# Patient Record
Sex: MALE | Race: WHITE | NOT HISPANIC OR LATINO | Employment: FULL TIME | URBAN - METROPOLITAN AREA
[De-identification: names, ages, dates, MRNs, and addresses within clinical notes are randomized per-mention and may not be internally consistent; named-entity substitution may affect disease eponyms.]

---

## 2020-08-08 ENCOUNTER — COMMUNICATION - HEALTHEAST (OUTPATIENT)
Dept: SCHEDULING | Facility: CLINIC | Age: 35
End: 2020-08-08

## 2020-08-12 ENCOUNTER — RECORDS - HEALTHEAST (OUTPATIENT)
Dept: ADMINISTRATIVE | Facility: OTHER | Age: 35
End: 2020-08-12

## 2021-06-10 NOTE — TELEPHONE ENCOUNTER
"No PCP.   Caller states he was tested on Tuesday and found out yesterday that his COVID test was positive. Today he feels like he is starting to get a fever= 99.5 (usually=97). Intermittent cough. Since last night he has right sided (\"pec\") chest pain and tightness. Chest pain varies from=\"5\" to \"unbearable if I move a certain way\". He has lost his sense of taste and smell.   Triaged to a disposition of Go to ED now, which he agrees to do.    Reason for Disposition    SEVERE or constant chest pain or pressure (Exception: mild central chest pain, present only when coughing)    Additional Information    Negative: SEVERE difficulty breathing (e.g., struggling for each breath, speaks in single words)    Negative: Difficult to awaken or acting confused (e.g., disoriented, slurred speech)    Negative: Bluish (or gray) lips or face now    Negative: Shock suspected (e.g., cold/pale/clammy skin, too weak to stand, low BP, rapid pulse)    Negative: Sounds like a life-threatening emergency to the triager    Negative: [1] COVID-19 exposure AND [2] no symptoms    Negative: COVID-19 and Breastfeeding, questions about    Negative: [1] Adult with possible COVID-19 symptoms AND [2] triager concerned about severity of symptoms or other causes    Protocols used: CORONAVIRUS (COVID-19) DIAGNOSED OR [id number] 5.16.20    COVID 19 Nurse Triage Plan/Patient Instructions    Please be aware that novel coronavirus (COVID-19) may be circulating in the community. If you develop symptoms such as fever, cough, or SOB or if you have concerns about the presence of another infection including coronavirus (COVID-19), please contact your health care provider or visit [email or web address].     Disposition/Instructions    ED Visit recommended. Follow protocol based instructions.      Bring Your Own Device:  Please also bring your smart device(s) (smart phones, tablets, laptops) and their charging cables for your personal use and to communicate with your " care team during your visit.      Thank you for taking steps to prevent the spread of this virus.  o Limit your contact with others.  o Wear a simple mask to cover your cough.  o Wash your hands well and often.    Resources    [facility]: About COVID-19: [email or web address]    CDC: What to Do If You're Sick: www.cdc.gov/coronavirus/2019-ncov/about/steps-when-sick.html    CDC: Ending Home Isolation: www.cdc.gov/coronavirus/2019-ncov/hcp/disposition-in-home-patients.html     CDC: Caring for Someone: www.cdc.gov/coronavirus/2019-ncov/if-you-are-sick/care-for-someone.html     [facility]: Interim Guidance for Hospital Discharge to Home: www.[facility].mn./diseases/coronavirus/hcp/hospdischarge.pdf    [facility] clinical trials (COVID-19 research studies): [email or web address].[facility]-clinical-trials     Below are the COVID-19 hotlines at the [place] [facility] ([facility]). Interpreters are available.   o For health questions: Call 624-551-1362 or 1-375.720.3646 (7 a.m. to 7 p.m.)  o For questions about schools and childcare: Call 944-570-5945 or 1-536.998.2974 (7 a.m. to 7 p.m.)

## 2021-09-25 ENCOUNTER — HOSPITAL ENCOUNTER (EMERGENCY)
Facility: CLINIC | Age: 36
Discharge: HOME | End: 2021-09-25
Admitting: EMERGENCY MEDICINE
Payer: COMMERCIAL

## 2021-09-25 VITALS
HEART RATE: 89 BPM | HEIGHT: 75 IN | OXYGEN SATURATION: 99 % | SYSTOLIC BLOOD PRESSURE: 142 MMHG | TEMPERATURE: 98.2 F | BODY MASS INDEX: 24.87 KG/M2 | WEIGHT: 200 LBS | DIASTOLIC BLOOD PRESSURE: 67 MMHG | RESPIRATION RATE: 16 BRPM

## 2021-09-25 DIAGNOSIS — M62.838 MUSCLE SPASM: ICD-10-CM

## 2021-09-25 DIAGNOSIS — M25.512 ACUTE PAIN OF LEFT SHOULDER: ICD-10-CM

## 2021-09-25 PROCEDURE — 96372 THER/PROPH/DIAG INJ SC/IM: CPT | Performed by: PHYSICIAN ASSISTANT

## 2021-09-25 PROCEDURE — 99284 EMERGENCY DEPT VISIT MOD MDM: CPT | Mod: 25

## 2021-09-25 PROCEDURE — 250N000013 HC RX MED GY IP 250 OP 250 PS 637: Performed by: PHYSICIAN ASSISTANT

## 2021-09-25 PROCEDURE — 250N000011 HC RX IP 250 OP 636: Performed by: PHYSICIAN ASSISTANT

## 2021-09-25 RX ORDER — KETOROLAC TROMETHAMINE 15 MG/ML
15 INJECTION, SOLUTION INTRAMUSCULAR; INTRAVENOUS ONCE
Status: COMPLETED | OUTPATIENT
Start: 2021-09-25 | End: 2021-09-25

## 2021-09-25 RX ORDER — LIDOCAINE 4 G/G
1 PATCH TOPICAL ONCE
Status: DISCONTINUED | OUTPATIENT
Start: 2021-09-25 | End: 2021-09-25 | Stop reason: HOSPADM

## 2021-09-25 RX ORDER — CYCLOBENZAPRINE HCL 10 MG
10 TABLET ORAL 3 TIMES DAILY PRN
Qty: 15 TABLET | Refills: 0 | Status: SHIPPED | OUTPATIENT
Start: 2021-09-25 | End: 2021-10-01

## 2021-09-25 RX ADMIN — KETOROLAC TROMETHAMINE 15 MG: 15 INJECTION, SOLUTION INTRAMUSCULAR; INTRAVENOUS at 13:39

## 2021-09-25 RX ADMIN — LIDOCAINE 1 PATCH: 246 PATCH TOPICAL at 13:39

## 2021-09-25 ASSESSMENT — ENCOUNTER SYMPTOMS
NAUSEA: 0
CHILLS: 0
JOINT SWELLING: 1
FEVER: 0
NUMBNESS: 1

## 2021-09-25 ASSESSMENT — MIFFLIN-ST. JEOR: SCORE: 1932.82

## 2021-09-25 NOTE — DISCHARGE INSTRUCTIONS
Use tylenol 650 and ibuprofen 600 mg every 6 hours as needed for pain.  You can use IcyHot, Bengay, Lidocaine Patch as needed. (Available over the counter)  You can also try flexeril (muscle relaxer) 3 times daily as needed for pain.     Follow up in clinic early next week for re-check.  Return to the emergency department if you develop fevers, redness, worsening pain, numbness, weakness, or any other concerning symptoms. We would be happy to see you.

## 2021-09-25 NOTE — ED PROVIDER NOTES
Expected Patient Referral to ED  12:16 PM    Referring Clinic/Provider:  [facility]    Reason for referral/Clinical facts:  Left shoulder pain, atraumatic.  Significant pain.  Needs further workup in his opinion.  History of drug abuse.  EKG normal there    Recommendations provided:  Send to ED    Caller was informed that this institution does  possess the capabilities and/or resources to provide for patient and should be transferred to our institution.    Based on the information provided, discussed that this patient likely is nota good candidate for direct admission to this institution and that provider could proceed as such.  If however direct admit is sought and any hurdles encountered, this ED would be happy to see the patient and evaluate.    Informed caller that recommendations provided are recommendations based only on the facts provided and that they responsible to accept or reject the advice, or to seek a formal in person consultation as needed and that this ED will see/treat patient should they arrive.      Marya Holland DO  Emergency Medicine  [facility] EMERGENCY ROOM  [facility] [facility] [address]  [phone]     Marya Holland DO  09/25/21 1218

## 2021-09-25 NOTE — ED TRIAGE NOTES
Patient is here with left shoulder pain. He stated when his arm is past his shoulder it relieves the the pain. He was at the urgent care today and was sent here for further workup. He does lift weights 5 days a week and also helped a friend move last week.

## 2021-09-25 NOTE — ED PROVIDER NOTES
EMERGENCY DEPARTMENT ENCOUNTER      NAME: Tomi Genao  AGE: 35 year old male  YOB: 1985  MRN: [id number]  EVALUATION DATE & TIME: 9/25/2021 12:46 PM    PCP: No primary care provider on file.    ED PROVIDER: Hilda Garnett PA-C      Chief Complaint   Patient presents with     Shoulder Pain         FINAL IMPRESSION:  1. Acute pain of left shoulder    2. Muscle spasm          ED COURSE & MEDICAL DECISION MAKING:    Pertinent Labs & Imaging studies reviewed. (See chart for details)  1:06 PM I met with the patient to gather history and to perform my initial exam. I discussed the plan for care while in the Emergency Department. PPE (gloves, glasses, surgical mask) was worn during patient encounters.     Tomi Genao is a 35 year old male who presents to the emergency department today for evaluation of a left shoulder injury. Reports pain started 4-5 days ago, worse in the last day. Does report recent lifting, helped a friend move, was moving bricks. Denies any known injury. No known injury. Denies prior shoulder issues, no recent injections. Does have a prior history of drug abuse, in remission, never used IV drugs. No fevers, nausea, or other systemic symptoms. Patient does report a few days ago he was talking to a friend, when all of a sudden the muscles in his shoulder/upper arm started twitching/spasming. Has been using ibuprofen and tylenol at home with no significant relief.     VSS, afebrile. Exam with no c-spine or thoracic spine tenderness, but does have tenderness to left trapezius musculature and into shoulder and biceps musculature. No deformities, ROM of left shoulder intact. Distal CMS intact.    Differential diagnosis includes but is not limited to: Bursitis, dislocation, humerus fracture, clavicular fracture, rotator cuff tendinitis, biceps tendinitis, osteoarthritis, a.c. separation, referred pain from the neck, rheumatoid arthritis, septic joint, thoracic outlet syndrome, brachial  plexus neuropathy, cancer metastases, others.      No sign of dislocation, humerus fracture, clavicular fracture, or other bony abnormality on exam. With no acute injury, no indication for XR at this time.     No tenderness over the AC joint.    No warmth of the skin or warmth to palpation to suggest septic joint, rheumatoid arthritis. Full ROM of the shoulder, no recent injections or IV drug use.     Doubt referred pain from the neck with no c-spine tenderness.    Tenderness more to the musculature of the left upper back and shoulder, suspect pain is primarily muscle spasm/strain related to recent heavy exertion.     No history of cancer to suggest mets.    No tenderness over the bursa to suggest bursitis.    Patient is reassured and given toradol and lidocaine in the emergency department. Will discharge with flexeril. Instructed on at home management, follow up with PCP and red flags/indications to return to the emergency department. All questions were answered to the best of my ability and patient is agreeable with plan.       MEDICATIONS GIVEN IN THE EMERGENCY:  Medications   Lidocaine (LIDOCARE) 4 % Patch 1 patch (1 patch Transdermal Patch/Med Applied 9/25/21 1339)     And   lidocaine patch in PLACE (has no administration in time range)   ketorolac (TORADOL) injection 15 mg (15 mg Intramuscular Given 9/25/21 1339)       NEW PRESCRIPTIONS STARTED AT TODAY'S ER VISIT  Discharge Medication List as of 9/25/2021  1:56 PM      START taking these medications    Details   cyclobenzaprine (FLEXERIL) 10 MG tablet Take 1 tablet (10 mg) by mouth 3 times daily as needed for muscle spasms, Disp-15 tablet, R-0, Local Print                =================================================================    HPI    Patient information was obtained from: Patient    Use of : N/A         Tomi Genao is a 35 year old male with a pertinent history of COVID-19,  PE, 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    Patient reports over the weekend he was helping a friend move, lifted brinks, and continued with his daily lifting exercises, but no known injury. On Tuesday 9/21 (~4 days ago), patient had sudden onset of posterior left shoulder pain that is constant in nature. He was managing the pain with Ibuprofen last dose 0430 (~9 hours ago), but yesterday (9/24) patient states the pain greatly increased. He has had muscle spasms and forearm numbness. Patient denies any IV drug use and notes he has be sober from meth and alcohol. No recent steroid injections. Denies fever, chills, swelling, nausea, or additional medical concerns or complaints at this time.         REVIEW OF SYSTEMS   Review of Systems   Constitutional: Negative for chills and fever.   Gastrointestinal: Negative for nausea.   Musculoskeletal: Positive for joint swelling (left posterior shoulder; no associated swelling).   Neurological: Positive for numbness (left forearm).       PAST MEDICAL HISTORY:  Past Medical History:   Diagnosis Date     Alcohol abuse      COVID-19      Methamphetamine abuse (H)      PE (pulmonary thromboembolism) (H)      Tobacco abuse        PAST SURGICAL HISTORY:  No past surgical history on file.        CURRENT MEDICATIONS:    Current Facility-Administered Medications   Medication     Lidocaine (LIDOCARE) 4 % Patch 1 patch    And     lidocaine patch in PLACE     Current Outpatient Medications   Medication     cyclobenzaprine (FLEXERIL) 10 MG tablet         ALLERGIES:  No Known Allergies    FAMILY HISTORY:  No family history on file.    SOCIAL HISTORY:   Social History     Socioeconomic History     Marital status: Single     Spouse name: Not on file     Number of children: Not on file     Years of education: Not on file     Highest education level: Not on file   Occupational History     Not on file   Tobacco Use     Smoking status: Not on file   Substance and  "Sexual Activity     Alcohol use: Yes     Drug use: Yes     Types: Methamphetamines, Marijuana     Sexual activity: Not on file   Other Topics Concern     Not on file   Social History Narrative     Not on file     Social Determinants of Health     Financial Resource Strain:      Difficulty of Paying Living Expenses:    Food Insecurity:      Worried About Running Out of Food in the Last Year:      Ran Out of Food in the Last Year:    Transportation Needs:      Lack of Transportation (Medical):      Lack of Transportation (Non-Medical):    Physical Activity:      Days of Exercise per Week:      Minutes of Exercise per Session:    Stress:      Feeling of Stress :    Social Connections:      Frequency of Communication with Friends and Family:      Frequency of Social Gatherings with Friends and Family:      Attends [employer] Services:      Active Member of Clubs or Organizations:      Attends Club or Organization Meetings:      Marital Status:    Intimate Partner Violence:      Fear of Current or Ex-Partner:      Emotionally Abused:      Physically Abused:      Sexually Abused:        VITALS:  BP (!) 142/67   Pulse 89   Temp 98.2  F (36.8  C) (Temporal)   Resp 16   Ht 1.905 m (6' 3\")   Wt 90.7 kg (200 lb)   SpO2 99%   BMI 25.00 kg/m      PHYSICAL EXAM    Constitutional: Well developed, Well nourished  HENT: Normocephalic, Atraumatic, No c-spine or thoracic spine tenderness.   Eyes: PERRL, EOMI, Conjunctiva normal, No discharge.   Respiratory: Normal breath sounds, No respiratory distress, No wheezing, Speaks full sentences easily.   Cardiovascular: Normal heart rate, Regular rhythm, No murmurs. Pulses intact.   Musculoskeletal: No deformities, moves left shoulder without guarding, ROM intact. Strength bilateral upper extremities intact. Tenderness with palpation along the left trapezius musculature and along the proximal biceps. No bony tenderness to shoulder or scapula. No thoracic spine tenderness.   Integument: " Warm, Dry, No erythema, No rash. No petechiae.   Neurologic: Alert & oriented x 3, Normal motor function, Normal sensory function, No focal deficits noted.   Psychiatric: Affect normal, Judgment normal, Mood normal. Cooperative.      LAB:  None    RADIOLOGY:  None    I, Yanira Vasquez am serving as a scribe to document services personally performed by Hilda Garnett PA-C, based on my observation and the provider's statements to me. I, Hilda Garnett PA-C  attest that Yanira Vasquez is acting in a scribe capacity, has observed my performance of the services and has documented them in accordance with my direction.    Hilda Garnett PA-C  Emergency Medicine  [facility] EMERGENCY ROOM  [address] [facility] [address]  [phone]  Dept: [phone]       Hilda Garnett PA-C  09/25/21 1289

## 2021-09-25 NOTE — Clinical Note
Tomi Genao was seen and treated in our emergency department on 9/25/2021.  He may return to work on 09/28/2021.       If you have any questions or concerns, please don't hesitate to call.      Hilda Garnett PA-C

## 2022-06-03 ENCOUNTER — HOSPITAL ENCOUNTER (EMERGENCY)
Facility: CLINIC | Age: 37
Discharge: HOME | End: 2022-06-03
Attending: STUDENT IN AN ORGANIZED HEALTH CARE EDUCATION/TRAINING PROGRAM | Admitting: STUDENT IN AN ORGANIZED HEALTH CARE EDUCATION/TRAINING PROGRAM
Payer: OTHER MISCELLANEOUS

## 2022-06-03 ENCOUNTER — APPOINTMENT (OUTPATIENT)
Dept: CT IMAGING | Facility: CLINIC | Age: 37
End: 2022-06-03
Attending: STUDENT IN AN ORGANIZED HEALTH CARE EDUCATION/TRAINING PROGRAM
Payer: OTHER MISCELLANEOUS

## 2022-06-03 VITALS
OXYGEN SATURATION: 99 % | HEIGHT: 75 IN | HEART RATE: 79 BPM | BODY MASS INDEX: 26.11 KG/M2 | WEIGHT: 210 LBS | TEMPERATURE: 98.4 F | RESPIRATION RATE: 18 BRPM | DIASTOLIC BLOOD PRESSURE: 88 MMHG | SYSTOLIC BLOOD PRESSURE: 130 MMHG

## 2022-06-03 DIAGNOSIS — S09.90XA HEAD INJURY, INITIAL ENCOUNTER: ICD-10-CM

## 2022-06-03 PROCEDURE — 70450 CT HEAD/BRAIN W/O DYE: CPT

## 2022-06-03 PROCEDURE — 12001 RPR S/N/AX/GEN/TRNK 2.5CM/<: CPT

## 2022-06-03 PROCEDURE — 250N000009 HC RX 250: Performed by: STUDENT IN AN ORGANIZED HEALTH CARE EDUCATION/TRAINING PROGRAM

## 2022-06-03 PROCEDURE — 99284 EMERGENCY DEPT VISIT MOD MDM: CPT | Mod: 25

## 2022-06-03 PROCEDURE — 12002 RPR S/N/AX/GEN/TRNK2.6-7.5CM: CPT

## 2022-06-03 RX ORDER — GINSENG 100 MG
CAPSULE ORAL ONCE
Status: COMPLETED | OUTPATIENT
Start: 2022-06-03 | End: 2022-06-03

## 2022-06-03 RX ADMIN — BACITRACIN: 500 OINTMENT TOPICAL at 13:45

## 2022-06-03 NOTE — ED PROVIDER NOTES
Emergency Department Encounter         FINAL IMPRESSION:  Head injury        ED COURSE AND MEDICAL DECISION MAKING       ED Course as of 06/03/22 1206   Fri Jun 03, 2022   1205 Patient is a healthy 36-year-old here with head injury.  He was using a jerardo pounder when it slipped and hit the top of his head.  He states he almost lost consciousness however did not.  No neck pain.  Bleeding is controlled on arrival.  On top of his head he has a stellate lesion approximate 4 cm in length.  Plan for CT to rule out fracture repair and most likely discharge home if normal.       12:01 PM Initial history and physical performed. Plan of care discussed.    1:00 PM I performed the laceration repair.     -CT normal.  Head laceration repaired successfully.  Patient tolerated procedure well.                    At the conclusion of the encounter I discussed the results of all the tests and the disposition. The questions were answered. The patient or family acknowledged understanding and was agreeable with the care plan.                  MEDICATIONS GIVEN IN THE EMERGENCY DEPARTMENT:  Medications - No data to display    NEW PRESCRIPTIONS STARTED AT TODAY'S ED VISIT:  New Prescriptions    No medications on file       HPI     Patient information obtained from: Patient    Use of Interpretor: N/A     Tomi Genao is a 36 year old male who presents to this ED by walk in for evaluation of head injury.    Patient reports that he was using a jerardo pounder when it got caught on the jerardo and it came back and hit him on the top of the head. Patient denies loss of consciousness but notes he was close. Patient denies cough, chest pain, shortness of breath, fever ,or any other concerns at this time.    REVIEW OF SYSTEMS:  Review of Systems   Constitutional: Negative for fever, malaise  HEENT: Positive for head trauma. Negative runny nose, sore throat, ear pain, neck pain  Respiratory: Negative for shortness of breath, cough,  "congestion  Cardiovascular: Negative for chest pain, leg edema  Gastrointestinal: Negative for abdominal distention, abdominal pain, constipation, vomiting, nausea, diarrhea  Genitourinary: Negative for dysuria and hematuria.   Integument: Positive for a laceration to the top of the head. Negative for rash, skin breakdown  Neurological: Negative for paresthesias, weakness, headache.  Musculoskeletal: Negative for joint pain, joint swelling      All other systems reviewed and are negative.          MEDICAL HISTORY     Past Medical History:   Diagnosis Date     Alcohol abuse      COVID-19      Methamphetamine abuse (H)      PE (pulmonary thromboembolism) (H)      Tobacco abuse        No past surgical history on file.    Social History     Substance Use Topics     Alcohol use: Yes     Drug use: Yes     Types: Methamphetamines, Marijuana       No current outpatient medications on file.          PHYSICAL EXAM     /68   Pulse 83   Temp 98.4  F (36.9  C) (Oral)   Resp 18   Ht 1.905 m (6' 3\")   Wt 95.3 kg (210 lb)   SpO2 97%   BMI 26.25 kg/m        PHYSICAL EXAM:     General: Patient appears well, nontoxic, comfortable  HEENT: Moist mucous membranes, no tongue swelling. No midline neck pain.  Stellate lesion noted to the top of patient's head.  4 cm total in length.  No midline neck pain.  No significant pain in the paraspinal musculature of the neck.  Abdominal: Soft, nontender, nondistended, no palpable masses, no guarding, no rebound  Musculoskeletal: Full range of motion of joints, no deformities appreciated.  5 out of 5 strength in the upper extremities.  No paresthesias.  No lower extremity weakness or paresthesias.  Neurological: Alert and oriented, grossly neurologically intact.  Psychological: Normal affect and mood.            RESULTS       Labs Ordered and Resulted from Time of ED Arrival to Time of ED Departure - No data to display    No orders to display "                     PROCEDURES:  Procedures:  [facility]    -Laceration Repair    Date/Time: 6/3/2022 12:15 PM  Performed by: Clarence Reece DO  Authorized by: Clarence Reece DO     Risks, benefits and alternatives discussed.      ANESTHESIA (see MAR for exact dosages):     Anesthesia method:  Local infiltration    Local anesthetic:  Bupivacaine 0.25% WITH epi  LACERATION DETAILS     Location:  Scalp    Scalp location:  Crown    Length (cm):  4    REPAIR TYPE:     Repair type:  Simple      EXPLORATION:     Hemostasis achieved with:  Epinephrine    Wound extent: no tendon damage, no underlying fracture and no vascular damage      TREATMENT:     Area cleansed with:  Saline    Amount of cleaning:  Standard    Irrigation solution:  Sterile saline    Irrigation method:  Syringe    SKIN REPAIR     Repair method:  Sutures    Suture size:  4-0    Suture material:  Nylon    Suture technique:  Simple interrupted    Number of sutures:  6    APPROXIMATION     Approximation:  Close    POST-PROCEDURE DETAILS     Dressing:  Antibiotic ointment        PROCEDURE    Patient Tolerance:  Patient tolerated the procedure well with no immediate complications         ISaran am serving as a scribe to document services personally performed by Clarence Reece DO, based on my observations and the provider's statements to me.  I, Clarence Reece DO, attest that Saran Hernandez is acting in a scribe capacity, has observed my performance of the services and has documented them in accordance with my direction.    Clarence Reece DO  Emergency Medicine  [facility] EMERGENCY ROOM      Clarence Reece DO  06/03/22 7010

## 2022-06-03 NOTE — ED TRIAGE NOTES
Patient presents to  The ED with complaints of head injury. He reports he hit himself in the head with a ground jerardo pounder after it caught on the post he was driving into the ground.

## 2022-06-11 ENCOUNTER — HOSPITAL ENCOUNTER (EMERGENCY)
Facility: CLINIC | Age: 37
Discharge: HOME | End: 2022-06-11
Attending: EMERGENCY MEDICINE | Admitting: EMERGENCY MEDICINE
Payer: OTHER MISCELLANEOUS

## 2022-06-11 VITALS
BODY MASS INDEX: 26.5 KG/M2 | WEIGHT: 212 LBS | TEMPERATURE: 98.6 F | DIASTOLIC BLOOD PRESSURE: 87 MMHG | OXYGEN SATURATION: 98 % | RESPIRATION RATE: 18 BRPM | SYSTOLIC BLOOD PRESSURE: 128 MMHG | HEART RATE: 79 BPM

## 2022-06-11 DIAGNOSIS — Z48.02 VISIT FOR SUTURE REMOVAL: ICD-10-CM

## 2022-06-11 PROCEDURE — 99281 EMR DPT VST MAYX REQ PHY/QHP: CPT

## 2022-06-11 NOTE — ED PROVIDER NOTES
EMERGENCY DEPARTMENT ENCOUNTER      NAME: Tomi Genao  AGE: 36 year old male  YOB: 1985  MRN: [id number]  EVALUATION DATE & TIME: 6/11/2022 11:42 AM    PCP: Milena Ref-Primary, Physician    ED PROVIDER: Jan Alvarez M.D.      Chief Complaint   Patient presents with     Suture Removal         FINAL IMPRESSION:  1. Visit for suture removal          ED COURSE & MEDICAL DECISION MAKING:    Pertinent Labs & Imaging studies reviewed. (See chart for details)  ED Course as of 06/11/22 1329   Sat Jun 11, 2022   1222 Patient is a 36-year-old otherwise healthy gentleman who presents for suture removal, 8 days after 6 sutures were placed to his scalp.  Wound margins are approximated, 6 sutures were removed, tension applied to the wound without gapping.  We discussed continued care, patient discharged.       Additional ED Course Timestamps:  12:16 PM I met with the patient, obtained history, performed an initial exam, and discussed options and plan for diagnostics and treatment here in the ED. PPE worn: N95 mask, gloves   12:26 PM We discussed the plan for discharge and the patient is agreeable. Reviewed supportive cares, symptomatic treatment, outpatient follow up, and reasons to return to the Emergency Department. Patient to be discharged by ED RN.     At the conclusion of the encounter I discussed the results of all of the tests and the disposition. The questions were answered. The patient or family acknowledged understanding and was agreeable with the care plan.     MEDICATIONS GIVEN IN THE EMERGENCY:  Medications - No data to display      NEW PRESCRIPTIONS STARTED AT TODAY'S ER VISIT  There are no discharge medications for this patient.         =================================================================    HPI    Patient information was obtained from: Patient    Use of : N/A         Tomi Genao is a 36 year old male with a pertinent history of VT, methamphetamine abuse, tobacco abuse,  and alcohol abuse who presents to this ED for evaluation of suture removal.    Per chart review, the patient was seen on 06/03/22 (~1 week ago) in the ED for head injury. Patient was using a jerardo pounder when it slipped and hit the top of his head. Bleeding was controlled upon arrival. Patient had a stellate lesion approximate 4 cm in length. CT normal. Head laceration repaired and patient was discharged.    Patient presents to the ED for suture removal. He denies any pain to the area. No other symptoms or complaints at this time.             REVIEW OF SYSTEMS   See HPI.    PAST MEDICAL HISTORY:  Past Medical History:   Diagnosis Date     Alcohol abuse      COVID-19      Methamphetamine abuse (H)      PE (pulmonary thromboembolism) (H)      Tobacco abuse        PAST SURGICAL HISTORY:  History reviewed. No pertinent surgical history.        CURRENT MEDICATIONS:    No current facility-administered medications for this encounter.     No current outpatient medications on file.       ALLERGIES:  No Known Allergies    FAMILY HISTORY:  History reviewed. No pertinent family history.    SOCIAL HISTORY:   Social History     Socioeconomic History     Marital status: Single   Substance and Sexual Activity     Alcohol use: Yes     Drug use: Yes     Types: Methamphetamines, Marijuana       VITALS:  /87   Pulse 79   Temp 98.6  F (37  C) (Oral)   Resp 18   Wt 96.2 kg (212 lb)   SpO2 98%   BMI 26.50 kg/m      PHYSICAL EXAM:   Constitutional: Well developed, well nourished. Comfortable appearing.   HENT: Normocephalic, atraumatic, mucous membranes moist, nose normal. 6 sutures to his parietal scalp without any surrounding erythema or edema.  Eyes: PERRL, EOMI, conjunctiva normal, no discharge.  Neck- Supple, gross ROM intact.   Respiratory: Normal work of breathing, normal rate, speaks in full sentences  Cardiovascular: Normal heart rate  Musculoskeletal: Moving all 4 extremities intentionally and without pain.    Neurologic: Alert & oriented x 3, cranial nerves grossly intact. Normal gross coordination  Psychiatric: Affect normal, cooperative.     LAB:  All pertinent labs reviewed and interpreted.  Labs Ordered and Resulted from Time of ED Arrival to Time of ED Departure - No data to display    RADIOLOGY:  Reviewed all pertinent imaging. Please see official radiology report.  No orders to display       EKG:    All EKG interpretations will be found in ED course above.      I, Masoud Ríos am serving as a scribe to document services personally performed by Dr. Jan Alvarez based on my observation and the provider's statements to me. I, Jan Alvarez MD attest that Masoud Ríos is acting in a scribe capacity, has observed my performance of the services and has documented them in accordance with my direction.    Jan Alvarez M.D.  Emergency Medicine  [facility] EMERGENCY ROOM  [address] [facility] [address]  [phone]  Dept: [phone]     Jan Alvarez MD  06/11/22 4754

## 2022-06-11 NOTE — ED TRIAGE NOTES
Patient here to have the sutures removed from the top of his head that were placed there 8 days ago.  Kamini Montes RN.......6/11/2022 11:32 AM     Triage Assessment     Row Name 06/11/22 1131       Triage Assessment (Adult)    Airway WDL WDL       Respiratory WDL    Respiratory WDL WDL       Skin Circulation/Temperature WDL    Skin Circulation/Temperature WDL WDL       Cardiac WDL    Cardiac WDL WDL       Peripheral/Neurovascular WDL    Peripheral Neurovascular WDL WDL       Cognitive/Neuro/Behavioral WDL    Cognitive/Neuro/Behavioral WDL WDL

## 2023-06-12 ENCOUNTER — HOSPITAL ENCOUNTER (OUTPATIENT)
Facility: CLINIC | Age: 38
Discharge: HOME | End: 2023-06-12
Attending: SURGERY | Admitting: SURGERY
Payer: COMMERCIAL

## 2023-06-12 ENCOUNTER — ANESTHESIA (OUTPATIENT)
Dept: SURGERY | Facility: CLINIC | Age: 38
End: 2023-06-12
Payer: COMMERCIAL

## 2023-06-12 ENCOUNTER — ANESTHESIA EVENT (OUTPATIENT)
Dept: SURGERY | Facility: CLINIC | Age: 38
End: 2023-06-12
Payer: COMMERCIAL

## 2023-06-12 VITALS
HEART RATE: 81 BPM | OXYGEN SATURATION: 98 % | HEIGHT: 75 IN | SYSTOLIC BLOOD PRESSURE: 144 MMHG | WEIGHT: 208 LBS | BODY MASS INDEX: 25.86 KG/M2 | RESPIRATION RATE: 14 BRPM | TEMPERATURE: 97.3 F | DIASTOLIC BLOOD PRESSURE: 89 MMHG

## 2023-06-12 DIAGNOSIS — K35.30 ACUTE APPENDICITIS WITH LOCALIZED PERITONITIS, WITHOUT PERFORATION, ABSCESS, OR GANGRENE: Primary | ICD-10-CM

## 2023-06-12 DIAGNOSIS — Z90.49 S/P LAPAROSCOPIC APPENDECTOMY: Primary | ICD-10-CM

## 2023-06-12 PROCEDURE — 250N000025 HC SEVOFLURANE, PER MIN: Performed by: SURGERY

## 2023-06-12 PROCEDURE — 999N000141 HC STATISTIC PRE-PROCEDURE NURSING ASSESSMENT: Performed by: SURGERY

## 2023-06-12 PROCEDURE — 250N000009 HC RX 250: Performed by: NURSE ANESTHETIST, CERTIFIED REGISTERED

## 2023-06-12 PROCEDURE — 710N000010 HC RECOVERY PHASE 1, LEVEL 2, PER MIN: Performed by: SURGERY

## 2023-06-12 PROCEDURE — 258N000003 HC RX IP 258 OP 636: Performed by: ANESTHESIOLOGY

## 2023-06-12 PROCEDURE — 360N000076 HC SURGERY LEVEL 3, PER MIN: Performed by: SURGERY

## 2023-06-12 PROCEDURE — 250N000009 HC RX 250: Performed by: SURGERY

## 2023-06-12 PROCEDURE — 370N000017 HC ANESTHESIA TECHNICAL FEE, PER MIN: Performed by: SURGERY

## 2023-06-12 PROCEDURE — 250N000011 HC RX IP 250 OP 636: Performed by: NURSE ANESTHETIST, CERTIFIED REGISTERED

## 2023-06-12 PROCEDURE — 272N000001 HC OR GENERAL SUPPLY STERILE: Performed by: SURGERY

## 2023-06-12 PROCEDURE — 88304 TISSUE EXAM BY PATHOLOGIST: CPT | Mod: 26 | Performed by: PATHOLOGY

## 2023-06-12 PROCEDURE — 710N000012 HC RECOVERY PHASE 2, PER MINUTE: Performed by: SURGERY

## 2023-06-12 PROCEDURE — 44970 LAPAROSCOPY APPENDECTOMY: CPT | Performed by: SURGERY

## 2023-06-12 PROCEDURE — 88304 TISSUE EXAM BY PATHOLOGIST: CPT | Mod: TC | Performed by: SURGERY

## 2023-06-12 RX ORDER — ONDANSETRON 4 MG/1
4 TABLET, ORALLY DISINTEGRATING ORAL EVERY 30 MIN PRN
Status: DISCONTINUED | OUTPATIENT
Start: 2023-06-12 | End: 2023-06-12 | Stop reason: HOSPADM

## 2023-06-12 RX ORDER — FENTANYL CITRATE 50 UG/ML
50 INJECTION, SOLUTION INTRAMUSCULAR; INTRAVENOUS EVERY 5 MIN PRN
Status: DISCONTINUED | OUTPATIENT
Start: 2023-06-12 | End: 2023-06-12 | Stop reason: HOSPADM

## 2023-06-12 RX ORDER — MULTIPLE VITAMINS W/ MINERALS TAB 9MG-400MCG
1 TAB ORAL DAILY
COMMUNITY

## 2023-06-12 RX ORDER — SODIUM CHLORIDE, SODIUM LACTATE, POTASSIUM CHLORIDE, CALCIUM CHLORIDE 600; 310; 30; 20 MG/100ML; MG/100ML; MG/100ML; MG/100ML
INJECTION, SOLUTION INTRAVENOUS CONTINUOUS
Status: DISCONTINUED | OUTPATIENT
Start: 2023-06-12 | End: 2023-06-12 | Stop reason: HOSPADM

## 2023-06-12 RX ORDER — ONDANSETRON 2 MG/ML
INJECTION INTRAMUSCULAR; INTRAVENOUS PRN
Status: DISCONTINUED | OUTPATIENT
Start: 2023-06-12 | End: 2023-06-12

## 2023-06-12 RX ORDER — PROPOFOL 10 MG/ML
INJECTION, EMULSION INTRAVENOUS CONTINUOUS PRN
Status: DISCONTINUED | OUTPATIENT
Start: 2023-06-12 | End: 2023-06-12

## 2023-06-12 RX ORDER — BUPIVACAINE HYDROCHLORIDE AND EPINEPHRINE 2.5; 5 MG/ML; UG/ML
INJECTION, SOLUTION EPIDURAL; INFILTRATION; INTRACAUDAL; PERINEURAL PRN
Status: DISCONTINUED | OUTPATIENT
Start: 2023-06-12 | End: 2023-06-12 | Stop reason: HOSPADM

## 2023-06-12 RX ORDER — FENTANYL CITRATE 50 UG/ML
INJECTION, SOLUTION INTRAMUSCULAR; INTRAVENOUS PRN
Status: DISCONTINUED | OUTPATIENT
Start: 2023-06-12 | End: 2023-06-12

## 2023-06-12 RX ORDER — LIDOCAINE HYDROCHLORIDE 10 MG/ML
INJECTION, SOLUTION INFILTRATION; PERINEURAL PRN
Status: DISCONTINUED | OUTPATIENT
Start: 2023-06-12 | End: 2023-06-12

## 2023-06-12 RX ORDER — OXYCODONE HYDROCHLORIDE 5 MG/1
5 TABLET ORAL
Status: DISCONTINUED | OUTPATIENT
Start: 2023-06-12 | End: 2023-06-12 | Stop reason: HOSPADM

## 2023-06-12 RX ORDER — KETOROLAC TROMETHAMINE 30 MG/ML
INJECTION, SOLUTION INTRAMUSCULAR; INTRAVENOUS PRN
Status: DISCONTINUED | OUTPATIENT
Start: 2023-06-12 | End: 2023-06-12

## 2023-06-12 RX ORDER — ONDANSETRON 2 MG/ML
4 INJECTION INTRAMUSCULAR; INTRAVENOUS EVERY 30 MIN PRN
Status: DISCONTINUED | OUTPATIENT
Start: 2023-06-12 | End: 2023-06-12 | Stop reason: HOSPADM

## 2023-06-12 RX ORDER — OXYCODONE HYDROCHLORIDE 5 MG/1
5 TABLET ORAL EVERY 4 HOURS PRN
Qty: 6 TABLET | Refills: 0 | Status: SHIPPED | OUTPATIENT
Start: 2023-06-12

## 2023-06-12 RX ORDER — HYDROMORPHONE HCL IN WATER/PF 6 MG/30 ML
0.4 PATIENT CONTROLLED ANALGESIA SYRINGE INTRAVENOUS EVERY 5 MIN PRN
Status: DISCONTINUED | OUTPATIENT
Start: 2023-06-12 | End: 2023-06-12 | Stop reason: HOSPADM

## 2023-06-12 RX ORDER — OXYCODONE HYDROCHLORIDE 5 MG/1
10 TABLET ORAL
Status: DISCONTINUED | OUTPATIENT
Start: 2023-06-12 | End: 2023-06-12 | Stop reason: HOSPADM

## 2023-06-12 RX ORDER — HYDROMORPHONE HCL IN WATER/PF 6 MG/30 ML
0.2 PATIENT CONTROLLED ANALGESIA SYRINGE INTRAVENOUS EVERY 5 MIN PRN
Status: DISCONTINUED | OUTPATIENT
Start: 2023-06-12 | End: 2023-06-12 | Stop reason: HOSPADM

## 2023-06-12 RX ORDER — PROPOFOL 10 MG/ML
INJECTION, EMULSION INTRAVENOUS PRN
Status: DISCONTINUED | OUTPATIENT
Start: 2023-06-12 | End: 2023-06-12

## 2023-06-12 RX ORDER — FENTANYL CITRATE 50 UG/ML
25 INJECTION, SOLUTION INTRAMUSCULAR; INTRAVENOUS EVERY 5 MIN PRN
Status: DISCONTINUED | OUTPATIENT
Start: 2023-06-12 | End: 2023-06-12 | Stop reason: HOSPADM

## 2023-06-12 RX ORDER — DEXAMETHASONE SODIUM PHOSPHATE 10 MG/ML
INJECTION, SOLUTION INTRAMUSCULAR; INTRAVENOUS PRN
Status: DISCONTINUED | OUTPATIENT
Start: 2023-06-12 | End: 2023-06-12

## 2023-06-12 RX ORDER — LIDOCAINE 40 MG/G
CREAM TOPICAL
Status: DISCONTINUED | OUTPATIENT
Start: 2023-06-12 | End: 2023-06-12 | Stop reason: HOSPADM

## 2023-06-12 RX ADMIN — MIDAZOLAM 2 MG: 1 INJECTION INTRAMUSCULAR; INTRAVENOUS at 13:21

## 2023-06-12 RX ADMIN — LIDOCAINE HYDROCHLORIDE 20 MG: 10 INJECTION, SOLUTION INFILTRATION; PERINEURAL at 13:28

## 2023-06-12 RX ADMIN — DEXAMETHASONE SODIUM PHOSPHATE 10 MG: 10 INJECTION, SOLUTION INTRAMUSCULAR; INTRAVENOUS at 13:28

## 2023-06-12 RX ADMIN — FENTANYL CITRATE 50 MCG: 50 INJECTION, SOLUTION INTRAMUSCULAR; INTRAVENOUS at 13:28

## 2023-06-12 RX ADMIN — HYDROMORPHONE HYDROCHLORIDE 0.5 MG: 1 INJECTION, SOLUTION INTRAMUSCULAR; INTRAVENOUS; SUBCUTANEOUS at 13:39

## 2023-06-12 RX ADMIN — SODIUM CHLORIDE, POTASSIUM CHLORIDE, SODIUM LACTATE AND CALCIUM CHLORIDE: 600; 310; 30; 20 INJECTION, SOLUTION INTRAVENOUS at 12:46

## 2023-06-12 RX ADMIN — ONDANSETRON 4 MG: 2 INJECTION INTRAMUSCULAR; INTRAVENOUS at 13:59

## 2023-06-12 RX ADMIN — KETOROLAC TROMETHAMINE 30 MG: 30 INJECTION, SOLUTION INTRAMUSCULAR at 14:00

## 2023-06-12 RX ADMIN — SUGAMMADEX 200 MG: 100 INJECTION, SOLUTION INTRAVENOUS at 14:05

## 2023-06-12 RX ADMIN — FENTANYL CITRATE 50 MCG: 50 INJECTION, SOLUTION INTRAMUSCULAR; INTRAVENOUS at 13:38

## 2023-06-12 RX ADMIN — SODIUM CHLORIDE, POTASSIUM CHLORIDE, SODIUM LACTATE AND CALCIUM CHLORIDE: 600; 310; 30; 20 INJECTION, SOLUTION INTRAVENOUS at 14:00

## 2023-06-12 RX ADMIN — HYDROMORPHONE HYDROCHLORIDE 0.5 MG: 1 INJECTION, SOLUTION INTRAMUSCULAR; INTRAVENOUS; SUBCUTANEOUS at 13:45

## 2023-06-12 RX ADMIN — PROPOFOL 50 MCG/KG/MIN: 10 INJECTION, EMULSION INTRAVENOUS at 13:28

## 2023-06-12 RX ADMIN — ROCURONIUM BROMIDE 60 MG: 10 INJECTION, SOLUTION INTRAVENOUS at 13:28

## 2023-06-12 RX ADMIN — PROPOFOL 200 MG: 10 INJECTION, EMULSION INTRAVENOUS at 13:28

## 2023-06-12 ASSESSMENT — ACTIVITIES OF DAILY LIVING (ADL)
ADLS_ACUITY_SCORE: 35
ADLS_ACUITY_SCORE: 35

## 2023-06-12 NOTE — ANESTHESIA CARE TRANSFER NOTE
Patient: Tomi Genao    Procedure: Procedure(s):  APPENDECTOMY, LAPAROSCOPIC       Diagnosis: Acute appendicitis with localized peritonitis, without perforation, abscess, or gangrene [K35.30]  Diagnosis Additional Information: No value filed.    Anesthesia Type:   General     Note:    Oropharynx: oropharynx clear of all foreign objects and spontaneously breathing  Level of Consciousness: awake  Oxygen Supplementation: face mask  Level of Supplemental Oxygen (L/min / FiO2): 6  Independent Airway: airway patency satisfactory and stable  Dentition: dentition unchanged  Vital Signs Stable: post-procedure vital signs reviewed and stable  Report to RN Given: handoff report given  Patient transferred to: PACU    Handoff Report: Identifed the Patient, Identified the Reponsible Provider, Reviewed the pertinent medical history, Discussed the surgical course, Reviewed Intra-OP anesthesia mangement and issues during anesthesia, Set expectations for post-procedure period and Allowed opportunity for questions and acknowledgement of understanding      Vitals:  Vitals Value Taken Time   /83 06/12/23 1421   Temp 36.3  C (97.3  F) 06/12/23 1420   Pulse 112 06/12/23 1422   Resp 9 06/12/23 1422   SpO2 100 % 06/12/23 1422   Vitals shown include unvalidated device data.    Electronically Signed By: HERBERT Strange CRNA  June 12, 2023  2:23 PM

## 2023-06-12 NOTE — ANESTHESIA PREPROCEDURE EVALUATION
Anesthesia Pre-Procedure Evaluation    Patient: Tomi Genao   MRN: [id number] : 1985        Procedure : Procedure(s):  APPENDECTOMY, LAPAROSCOPIC          Past Medical History:   Diagnosis Date     Alcohol abuse      COVID-19      Methamphetamine abuse (H)      PE (pulmonary thromboembolism) (H)      Tobacco abuse       History reviewed. No pertinent surgical history.   No Known Allergies   Social History     Tobacco Use     Smoking status: Every Day     Smokeless tobacco: Not on file   Vaping Use     Vaping status: Every Day     Substances: Nicotine     Devices: Disposable, Refillable tank   Substance Use Topics     Alcohol use: Yes      Wt Readings from Last 1 Encounters:   22 96.2 kg (212 lb)        Anesthesia Evaluation   Pt has not had prior anesthetic         ROS/MED HX  ENT/Pulmonary: Comment: Vapes daily  - neg pulmonary ROS     Neurologic:  - neg neurologic ROS     Cardiovascular:    (-) hypertension and MIRZA   METS/Exercise Tolerance:     Hematologic:       Musculoskeletal:  - neg musculoskeletal ROS     GI/Hepatic:     (+) appendicitis,     Renal/Genitourinary:  - neg Renal ROS     Endo:  - neg endo ROS     Psychiatric/Substance Use: Comment: 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    Infectious Disease:       Malignancy:       Other:            Physical Exam    Airway        Mallampati: I   TM distance: > 3 FB   Neck ROM: full   Mouth opening: > 3 cm    Respiratory Devices and Support         Dental       (+) Minor Abnormalities - some fillings, tiny chips      Cardiovascular   cardiovascular exam normal          Pulmonary   pulmonary exam normal                OUTSIDE LABS:  CBC:   Lab Results   Component Value Date    WBC 7.3 2020    WBC 9.2 10/04/2020    HGB 15.6 2020    HGB 14.8 10/04/2020    HCT 43.5 2020    HCT 42.3 10/04/2020     2020     10/04/2020     BMP:   Lab Results    Component Value Date     11/26/2020     10/04/2020    POTASSIUM 3.7 11/26/2020    POTASSIUM 4.1 10/04/2020    CHLORIDE 102 11/26/2020    CHLORIDE 104 10/04/2020    CO2 26 11/26/2020    CO2 23 10/04/2020    BUN 10 11/26/2020    BUN 10 10/04/2020    CR 1.01 11/26/2020    CR 0.95 10/04/2020    GLC 99 11/26/2020    GLC 99 10/04/2020     COAGS:   Lab Results   Component Value Date    PTT 29 08/08/2020    INR 1.00 08/08/2020     POC: No results found for: BGM, HCG, HCGS  HEPATIC:   Lab Results   Component Value Date    ALBUMIN 4.3 11/26/2020    PROTTOTAL 7.2 11/26/2020    ALT 43 11/26/2020    AST 23 11/26/2020    ALKPHOS 101 11/26/2020    BILITOTAL 0.8 11/26/2020     OTHER:   Lab Results   Component Value Date    CLAUDIA 9.7 11/26/2020    MAG 2.1 11/26/2020    LIPASE 11 09/26/2020       Anesthesia Plan    ASA Status:  2   NPO Status:  NPO Appropriate    Anesthesia Type: General.     - Airway: ETT   Induction: Intravenous.           Consents    Anesthesia Plan(s) and associated risks, benefits, and realistic alternatives discussed. Questions answered and patient/representative(s) expressed understanding.    - Discussed:     - Discussed with:  Patient      - Patient is DNR/DNI Status: No         Postoperative Care    Pain management: IV analgesics, Multi-modal analgesia.   PONV prophylaxis: Ondansetron (or other 5HT-3), Dexamethasone or Solumedrol     Comments:                Maye Henderson MD

## 2023-06-12 NOTE — ANESTHESIA POSTPROCEDURE EVALUATION
Patient: Tomi Genao    Procedure: Procedure(s):  APPENDECTOMY, LAPAROSCOPIC       Anesthesia Type:  General    Note:  Disposition: Outpatient   Postop Pain Control: Uneventful            Sign Out: Well controlled pain   PONV: No   Neuro/Psych: Uneventful            Sign Out: Acceptable/Baseline neuro status   Airway/Respiratory: Uneventful            Sign Out: Acceptable/Baseline resp. status   CV/Hemodynamics: Uneventful            Sign Out: Acceptable CV status; No obvious hypovolemia; No obvious fluid overload   Other NRE: NONE   DID A NON-ROUTINE EVENT OCCUR? No           Last vitals:  Vitals Value Taken Time   /83 06/12/23 1440   Temp 36.3  C (97.3  F) 06/12/23 1420   Pulse 92 06/12/23 1440   Resp 16 06/12/23 1440   SpO2 100 % 06/12/23 1440       Electronically Signed By: Maye Henderson MD  June 12, 2023  2:52 PM  
Initial (On Arrival)

## 2023-06-12 NOTE — ANESTHESIA PROCEDURE NOTES
Airway       Patient location during procedure: OR       Procedure Start/Stop Times: 6/12/2023 1:30 PM and 6/12/2023 1:32 PM  Staff -        CRNA: Kaushal Presley APRN CRNA       Performed By: CRNA  Consent for Airway        Urgency: elective  Indications and Patient Condition       Indications for airway management: chris-procedural       Induction type:intravenous       Mask difficulty assessment: 1 - vent by mask    Final Airway Details       Final airway type: endotracheal airway       Successful airway: ETT - single  Endotracheal Airway Details        ETT size (mm): 7.5       Successful intubation technique: direct laryngoscopy       DL Blade Type: Schwarz 2       Grade View of Cords: 1       Adjucts: stylet       Position: Right       Measured from: gums/teeth       Secured at (cm): 23    Post intubation assessment        Placement verified by: capnometry, equal breath sounds and chest rise        Number of attempts at approach: 1       Secured with: silk tape       Ease of procedure: easy       Dentition: Intact and Unchanged       Dental guard used and removed. Dental Guard Type: Proguard Red.    Medication(s) Administered   Medication Administration Time: 6/12/2023 1:30 PM

## 2023-06-12 NOTE — OP NOTE
General Surgery Operative Note    Date of Surgery: 6/12/2023     Surgeon: Shahid Peña MD    Assistant: None    Preoperative Diagnosis: Acute appendicitis    Postoperative Diagnosis: Acute appendicitis    Procedure: Laparoscopic appendectomy    EBL: 10 cc    Findings: Acutely inflamed appendix adherent to the pelvic sidewall on the right    Indications:  Patient is a 37-year-old man who presented to the urgent care complaining of about 10 hours of right lower quadrant abdominal pain.  His work-up was consistent with appendicitis.  After discussion of the risk and benefits of laparoscopic appendectomy, the patient consented to the procedure.    Details of operation:  The patient was brought to the operating room placed on the table in the supine position.  General anesthesia was induced without incident.  The patient was prepped and draped in the usual sterile fashion.  A timeout for safety was performed.    I began with a periumbilical incision and a Veress needle was introduced.  Pneumoperitoneum was established without incident.  A 5 mm optical port was placed at the site.  There was no injury with entry.  2 additional 5 mm ports were placed in the left lower quadrant and suprapubic area.  The appendix was identified adherent to the pelvic sidewall on the right.  The congenital adhesions were taken down as well as the mesoappendix using electrocautery.  The appendix was cleared all the way to the base.  I then used 3 Endoloops to ligate the base the appendix.  I divided between the loops leaving to below.  Appendix was placed in Endo Catch bag and removed from the abdomen.  The dissection area was inspected again was found to be adequately hemostatic.  The ports were removed and insufflation was released.  The skin was closed with 4-0 Monocryl suture and skin glue was used as a dressing.  Patient tolerated the procedure well.  There were no immediately apparent complications.    Shahid Peña MD   General Surgeon  [facility]  Surgery [address]  [place] [address]?  Office: [phone]

## 2023-06-12 NOTE — PHARMACY-ADMISSION MEDICATION HISTORY
Pharmacist completed medication history with the patient while in the [facility].  Prior to admission (PTA) med list completed and updated in the electronic medical record (EMR).  Pharmacy Note - Admission Medication History  Pertinent Provider Information: none   ______________________________________________________________________  Prior To Admission (PTA) med list completed and updated in EMR.     Medications Prior to Admission   Medication Sig Dispense Refill Last Dose     multivitamin w/minerals (MULTI-VITAMIN) tablet Take 1 tablet by mouth daily   6/11/2023         Information source(s): Patient and CareEverywhere/SureScripts  Patient was asked about OTC/herbal products specifically.  PTA med list reflects this.  Based on the pharmacist s assessment, the PTA med list information appears reliable  Allergies were reviewed, assessed, and updated with the patient.    Medications available for use during hospital stay: none  Thank you for the opportunity to participate in the care of this patient.    The patient was asked about OTC/herbal products specifically and the PTA med list reflects the patient's response.    Allergies were reviewed and assessed with the patient, responses were updated in the EMR.    Thank you for the opportunity to participate in the care of this patient.    Shakeel Diallo RPH 6/12/2023 1:18 PM

## 2023-06-12 NOTE — H&P
General Surgery Consult    Tomi Genao MRN# [id number]     Date of Admission: 6/12/2023    Reason for Consult  Acute appendicitis    History of Present Illness  Patient is a 47-year-old man who presented to the urgent care today with complaints of right side abdominal pain.  Began yesterday around 8:30 PM.  It was initially generalized and not well localized.  Throughout the day, to become localized to his lower abdomen.  He presented to urgent care.  He denies significant nausea or vomiting.  His work-up at the urgent care revealed appendicitis on CT scan.  I was contacted by answering physician and the patient was transferred for surgery.  1 g of ertapenem was given.    Past Medical History:  Past Medical History:   Diagnosis Date     Alcohol abuse      COVID-19      Methamphetamine abuse (H)      PE (pulmonary thromboembolism) (H)      Tobacco abuse        Past Surgical History:  No prior surgeries    Allergies:   No Known Allergies  Medications:  No current facility-administered medications on file prior to encounter.  No current outpatient medications on file prior to encounter.    Social History:  Patient works as an   Social History     Socioeconomic History     Marital status: Single     Spouse name: Not on file     Number of children: Not on file     Years of education: Not on file     Highest education level: Not on file   Occupational History     Not on file   Tobacco Use     Smoking status: Not on file     Smokeless tobacco: Not on file   Vaping Use     Vaping status: Not on file   Substance and Sexual Activity     Alcohol use: Yes     Drug use: Yes     Types: Methamphetamines, Marijuana     Sexual activity: Not on file   Other Topics Concern     Not on file   Social History Narrative     Not on file     Social Determinants of Health     Financial Resource Strain: Not on file   Food Insecurity: Not on file   Transportation Needs: Not on file   Physical Activity: Not on file   Stress: Not on  file   Social Connections: Not on file   Intimate Partner Violence: Not on file   Housing Stability: Not on file     Family History:  History reviewed. No pertinent family history.    ROS:  12 point review negative except as stated in H&P    Exam:  There were no vitals taken for this visit.  General: Alert, interactive, NAD  Resp: Non-labored breathing on room air  Cardiac: RRR  Abdomen: Abdomen is soft and nondistended.  Focally tender in the right lower quadrant.    Labs:   Not available    Imaging:   None available but per the urgent care physician I spoke with, he has acute appendicitis without perforation.    Assessment: 37 year old male with no significant past medical or surgical history presenting with right lower quadrant pain and appendicitis on CT scan.  We discussed surgical management of this disease.  We discussed the risks of bleeding, infection, injury to other organs.  The patient consented the procedure.    Plan:   The OR for laparoscopic appendectomy  Anticipate discharge from covering    Shahid Peña MD  General Surgeon  [facility]  Surgery [address]  [address] [place] [id number]?  Office: [phone]

## 2023-06-16 LAB
PATH REPORT.COMMENTS IMP SPEC: NORMAL
PATH REPORT.COMMENTS IMP SPEC: NORMAL
PATH REPORT.FINAL DX SPEC: NORMAL
PATH REPORT.GROSS SPEC: NORMAL
PATH REPORT.MICROSCOPIC SPEC OTHER STN: NORMAL
PATH REPORT.RELEVANT HX SPEC: NORMAL
PHOTO IMAGE: NORMAL

## (undated) DEVICE — PREP CHLORAPREP 26ML TINTED HI-LITE ORANGE 930815

## (undated) DEVICE — ESU CORD MONOPOLAR 10'  E0510

## (undated) DEVICE — SUCTION MANIFOLD NEPTUNE 2 SYS 1 PORT 702-025-000

## (undated) DEVICE — ENDO TROCAR FIRST ENTRY KII FIOS Z-THRD 05X100MM CTF03

## (undated) DEVICE — Device

## (undated) DEVICE — ENDO POUCH UNIVERSAL RETRIEVAL SYSTEM INZII 5MM CD003

## (undated) DEVICE — SU DERMABOND ADVANCED .7ML DNX12

## (undated) DEVICE — ENDO TROCAR FIRST ENTRY KII FIOS Z-THRD 12X100MM CTF73

## (undated) DEVICE — NDL INSUFFLATION 13GA 120MM C2201

## (undated) DEVICE — TUBING SMOKE EVAC PNEUMOCLEAR HIGH FLOW 0620050250

## (undated) DEVICE — ENDO SHEARS RENEW LAP ENDOCUT SCISSOR TIP 16.5MM 3142

## (undated) DEVICE — SUTURE ENDO SURGITIE 21 POLYSORB EL23LN

## (undated) DEVICE — DECANTER VIAL 2006S

## (undated) DEVICE — SU VICRYL+ 0 27 UR6 VLT VCP603H

## (undated) DEVICE — SOL NACL 0.9% IRRIG 1000ML BOTTLE 2F7124

## (undated) DEVICE — CUSTOM PACK LAP CHOLE SBA5BLCHEA

## (undated) DEVICE — SUTURE MONOCRYL+ 4-0 PS-2 27IN MCP426H

## (undated) DEVICE — GLOVE BIOGEL PI ULTRATOUCH G SZ 7.5 42175

## (undated) DEVICE — PLATE GROUNDING ADULT W/CORD 9165L

## (undated) DEVICE — ENDO TROCAR SLEEVE KII Z-THREADED 05X100MM CTS02

## (undated) DEVICE — SOL WATER IRRIG 1000ML BOTTLE 2F7114

## (undated) DEVICE — GLOVE BIOGEL PI INDICATOR 8.0 LF 41680

## (undated) RX ORDER — KETOROLAC TROMETHAMINE 30 MG/ML
INJECTION, SOLUTION INTRAMUSCULAR; INTRAVENOUS
Status: DISPENSED
Start: 2023-06-12

## (undated) RX ORDER — FENTANYL CITRATE 50 UG/ML
INJECTION, SOLUTION INTRAMUSCULAR; INTRAVENOUS
Status: DISPENSED
Start: 2023-06-12